# Patient Record
Sex: FEMALE | Race: WHITE | HISPANIC OR LATINO | URBAN - METROPOLITAN AREA
[De-identification: names, ages, dates, MRNs, and addresses within clinical notes are randomized per-mention and may not be internally consistent; named-entity substitution may affect disease eponyms.]

---

## 2018-01-19 ENCOUNTER — APPOINTMENT (RX ONLY)
Dept: URBAN - METROPOLITAN AREA CLINIC 128 | Facility: CLINIC | Age: 28
Setting detail: DERMATOLOGY
End: 2018-01-19

## 2018-01-19 DIAGNOSIS — L40.0 PSORIASIS VULGARIS: ICD-10-CM

## 2018-01-19 PROCEDURE — 99202 OFFICE O/P NEW SF 15 MIN: CPT

## 2018-01-19 PROCEDURE — ? COUNSELING

## 2018-01-19 PROCEDURE — ? TREATMENT REGIMEN

## 2018-01-19 PROCEDURE — ? PRESCRIPTION

## 2018-01-19 RX ORDER — FLURANDRENOLIDE 0.5 MG/ML
LOTION TOPICAL
Qty: 1 | Refills: 4 | Status: ERX | COMMUNITY
Start: 2018-01-19

## 2018-01-19 RX ORDER — TRIAMCINOLONE ACETONIDE 1 MG/G
CREAM TOPICAL
Qty: 1 | Refills: 4 | Status: ERX | COMMUNITY
Start: 2018-01-19

## 2018-01-19 RX ADMIN — FLURANDRENOLIDE: 0.5 LOTION TOPICAL at 00:00

## 2018-01-19 RX ADMIN — TRIAMCINOLONE ACETONIDE: 1 CREAM TOPICAL at 00:00

## 2018-01-19 ASSESSMENT — LOCATION SIMPLE DESCRIPTION DERM
LOCATION SIMPLE: LEFT FOREARM
LOCATION SIMPLE: RIGHT BREAST
LOCATION SIMPLE: SCALP
LOCATION SIMPLE: LEFT CALF
LOCATION SIMPLE: LEFT POSTERIOR THIGH
LOCATION SIMPLE: RIGHT CALF
LOCATION SIMPLE: RIGHT BUTTOCK
LOCATION SIMPLE: LEFT UPPER ARM
LOCATION SIMPLE: CHEST
LOCATION SIMPLE: RIGHT THIGH
LOCATION SIMPLE: LEFT THIGH
LOCATION SIMPLE: RIGHT PRETIBIAL REGION
LOCATION SIMPLE: LEFT PRETIBIAL REGION
LOCATION SIMPLE: RIGHT FOREARM
LOCATION SIMPLE: RIGHT POSTERIOR THIGH
LOCATION SIMPLE: RIGHT UPPER BACK
LOCATION SIMPLE: RIGHT UPPER ARM

## 2018-01-19 ASSESSMENT — LOCATION ZONE DERM
LOCATION ZONE: TRUNK
LOCATION ZONE: SCALP
LOCATION ZONE: ARM
LOCATION ZONE: LEG

## 2018-01-19 ASSESSMENT — LOCATION DETAILED DESCRIPTION DERM
LOCATION DETAILED: RIGHT PROXIMAL CALF
LOCATION DETAILED: RIGHT INFERIOR MEDIAL UPPER BACK
LOCATION DETAILED: LEFT VENTRAL LATERAL DISTAL FOREARM
LOCATION DETAILED: LEFT DISTAL POSTERIOR THIGH
LOCATION DETAILED: LEFT PROXIMAL DORSAL FOREARM
LOCATION DETAILED: LEFT LATERAL SUPERIOR CHEST
LOCATION DETAILED: RIGHT MEDIAL BREAST 1-2:00 REGION
LOCATION DETAILED: LEFT PROXIMAL PRETIBIAL REGION
LOCATION DETAILED: RIGHT DISTAL POSTERIOR THIGH
LOCATION DETAILED: RIGHT VENTRAL DISTAL FOREARM
LOCATION DETAILED: RIGHT DISTAL PRETIBIAL REGION
LOCATION DETAILED: RIGHT ANTERIOR DISTAL THIGH
LOCATION DETAILED: RIGHT ANTERIOR DISTAL UPPER ARM
LOCATION DETAILED: LEFT PROXIMAL CALF
LOCATION DETAILED: RIGHT LATERAL BUTTOCK
LOCATION DETAILED: LEFT SUPERIOR PARIETAL SCALP
LOCATION DETAILED: LEFT ANTERIOR DISTAL UPPER ARM
LOCATION DETAILED: LEFT ANTERIOR PROXIMAL THIGH

## 2018-01-19 ASSESSMENT — PGA PSORIASIS: PGA PSORIASIS: MODERATE (MODERATE PLAQUE ELEVATION, MODERATE ERYTHEMA, COARSE SCALE PREDOMINATES)

## 2018-01-19 NOTE — PROCEDURE: TREATMENT REGIMEN
Action 4: Continue
Detail Level: Zone
Otc Regimen: Recommended T-Gel or T-Sal shampoo every other day

## 2018-01-19 NOTE — HPI: RASH
How Severe Is Your Rash?: moderate
Is This A New Presentation, Or A Follow-Up?: Rash
Additional History: New patient today

## 2018-02-09 ENCOUNTER — APPOINTMENT (RX ONLY)
Dept: URBAN - METROPOLITAN AREA CLINIC 128 | Facility: CLINIC | Age: 28
Setting detail: DERMATOLOGY
End: 2018-02-09

## 2018-02-09 DIAGNOSIS — L40.0 PSORIASIS VULGARIS: ICD-10-CM | Status: IMPROVED

## 2018-02-09 PROCEDURE — 99213 OFFICE O/P EST LOW 20 MIN: CPT

## 2018-02-09 PROCEDURE — ? COUNSELING

## 2018-02-09 PROCEDURE — ? TREATMENT REGIMEN

## 2018-02-09 PROCEDURE — ? PRESCRIPTION

## 2018-02-09 RX ORDER — SELENIUM SULFIDE 23 MG/ML
SHAMPOO TOPICAL
Qty: 1 | Refills: 5 | Status: ERX | COMMUNITY
Start: 2018-02-09

## 2018-02-09 RX ORDER — CLOBETASOL PROPIONATE 0.5 MG/G
OINTMENT TOPICAL
Qty: 1 | Refills: 3 | Status: ERX | COMMUNITY
Start: 2018-02-09

## 2018-02-09 RX ORDER — CALCIPOTRIENE 50 UG/G
CREAM TOPICAL
Qty: 1 | Refills: 3 | Status: ERX | COMMUNITY
Start: 2018-02-09

## 2018-02-09 RX ADMIN — SELENIUM SULFIDE: 23 SHAMPOO TOPICAL at 00:00

## 2018-02-09 RX ADMIN — CALCIPOTRIENE: 50 CREAM TOPICAL at 00:00

## 2018-02-09 RX ADMIN — CLOBETASOL PROPIONATE: 0.5 OINTMENT TOPICAL at 00:00

## 2018-02-09 ASSESSMENT — LOCATION DETAILED DESCRIPTION DERM
LOCATION DETAILED: LEFT VENTRAL LATERAL DISTAL FOREARM
LOCATION DETAILED: LEFT DISTAL POSTERIOR THIGH
LOCATION DETAILED: RIGHT ANTERIOR DISTAL THIGH
LOCATION DETAILED: RIGHT DISTAL POSTERIOR THIGH
LOCATION DETAILED: LEFT ANTERIOR DISTAL UPPER ARM
LOCATION DETAILED: LEFT SUPERIOR PARIETAL SCALP
LOCATION DETAILED: LEFT LATERAL SUPERIOR CHEST
LOCATION DETAILED: LEFT PROXIMAL PRETIBIAL REGION
LOCATION DETAILED: RIGHT MEDIAL BREAST 1-2:00 REGION
LOCATION DETAILED: RIGHT VENTRAL DISTAL FOREARM
LOCATION DETAILED: RIGHT DISTAL PRETIBIAL REGION
LOCATION DETAILED: RIGHT ANTERIOR DISTAL UPPER ARM
LOCATION DETAILED: RIGHT INFERIOR MEDIAL UPPER BACK
LOCATION DETAILED: RIGHT LATERAL BUTTOCK
LOCATION DETAILED: LEFT ANTERIOR PROXIMAL THIGH
LOCATION DETAILED: LEFT PROXIMAL CALF
LOCATION DETAILED: RIGHT PROXIMAL CALF
LOCATION DETAILED: LEFT PROXIMAL DORSAL FOREARM

## 2018-02-09 ASSESSMENT — LOCATION SIMPLE DESCRIPTION DERM
LOCATION SIMPLE: RIGHT BUTTOCK
LOCATION SIMPLE: RIGHT UPPER BACK
LOCATION SIMPLE: RIGHT BREAST
LOCATION SIMPLE: RIGHT FOREARM
LOCATION SIMPLE: RIGHT UPPER ARM
LOCATION SIMPLE: LEFT THIGH
LOCATION SIMPLE: RIGHT PRETIBIAL REGION
LOCATION SIMPLE: LEFT POSTERIOR THIGH
LOCATION SIMPLE: LEFT PRETIBIAL REGION
LOCATION SIMPLE: LEFT UPPER ARM
LOCATION SIMPLE: SCALP
LOCATION SIMPLE: CHEST
LOCATION SIMPLE: RIGHT POSTERIOR THIGH
LOCATION SIMPLE: RIGHT CALF
LOCATION SIMPLE: LEFT CALF
LOCATION SIMPLE: LEFT FOREARM
LOCATION SIMPLE: RIGHT THIGH

## 2018-02-09 ASSESSMENT — LOCATION ZONE DERM
LOCATION ZONE: TRUNK
LOCATION ZONE: SCALP
LOCATION ZONE: ARM
LOCATION ZONE: LEG

## 2018-02-09 NOTE — PROCEDURE: TREATMENT REGIMEN
Action 4: Continue
Continue Regimen: T-gel shampoo, Nolix once a day, Triamcinolone cream on the thin plaques
Other Instructions: Discussed UVB, MTX
Sig For Treatment 1 (If Needed): applied twice daily
Detail Level: Zone
Start Regimen: Calcipotriene cream Twice a day, Clobetasol ointment on the thick plaques, SelRx shampoo
Treatment 1: Triamcinolone 0.1% cream

## 2019-02-21 ENCOUNTER — APPOINTMENT (RX ONLY)
Dept: URBAN - METROPOLITAN AREA CLINIC 128 | Facility: CLINIC | Age: 29
Setting detail: DERMATOLOGY
End: 2019-02-21

## 2019-02-21 DIAGNOSIS — L40.4 GUTTATE PSORIASIS: ICD-10-CM

## 2019-02-21 PROBLEM — L40.0 PSORIASIS VULGARIS: Status: ACTIVE | Noted: 2019-02-21

## 2019-02-21 PROCEDURE — ? COUNSELING

## 2019-02-21 PROCEDURE — ? DIAGNOSIS COMMENT

## 2019-02-21 PROCEDURE — ? TREATMENT REGIMEN

## 2019-02-21 PROCEDURE — 99214 OFFICE O/P EST MOD 30 MIN: CPT

## 2019-02-21 PROCEDURE — ? PRESCRIPTION

## 2019-02-21 RX ORDER — TRIAMCINOLONE ACETONIDE 1 MG/G
CREAM TOPICAL
Qty: 1 | Refills: 1 | Status: ERX

## 2019-02-21 RX ORDER — TRIAMCINOLONE ACETONIDE 0.25 MG/G
CREAM TOPICAL
Qty: 1 | Refills: 1 | Status: ERX | COMMUNITY
Start: 2019-02-21

## 2019-02-21 RX ADMIN — TRIAMCINOLONE ACETONIDE: 0.25 CREAM TOPICAL at 15:46

## 2019-02-21 ASSESSMENT — LOCATION DETAILED DESCRIPTION DERM
LOCATION DETAILED: LEFT PROXIMAL PRETIBIAL REGION
LOCATION DETAILED: RIGHT PROXIMAL PRETIBIAL REGION
LOCATION DETAILED: LEFT LATERAL ABDOMEN
LOCATION DETAILED: RIGHT PROXIMAL DORSAL FOREARM
LOCATION DETAILED: LEFT INFERIOR CENTRAL MALAR CHEEK
LOCATION DETAILED: LEFT PROXIMAL DORSAL FOREARM

## 2019-02-21 ASSESSMENT — LOCATION ZONE DERM
LOCATION ZONE: LEG
LOCATION ZONE: FACE
LOCATION ZONE: TRUNK
LOCATION ZONE: ARM

## 2019-02-21 ASSESSMENT — LOCATION SIMPLE DESCRIPTION DERM
LOCATION SIMPLE: ABDOMEN
LOCATION SIMPLE: LEFT FOREARM
LOCATION SIMPLE: LEFT PRETIBIAL REGION
LOCATION SIMPLE: RIGHT FOREARM
LOCATION SIMPLE: LEFT CHEEK
LOCATION SIMPLE: RIGHT PRETIBIAL REGION

## 2019-02-21 NOTE — PROCEDURE: TREATMENT REGIMEN
Start Regimen: - triamcinolone acetonide 0.1 % topical cream on body BID x 2 wks then QD on 3rd wk, then d/c on 4th wk\\n- triamcinolone acetonide 0.025 % topical cream BID x 2 wks to face then d/c
Action 4: Continue
Other Instructions: F/u in 3 wks w/ Kenith Goldmann for re-evaluation and discuss other treatments if no improvement (oral vs. injection therapy)
Continue Regimen: Good skin care (cool showers, dove soap, moisturize daily with CeraVe)
Detail Level: Zone

## 2019-02-21 NOTE — PROCEDURE: DIAGNOSIS COMMENT
Detail Level: Detailed
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 Patient states she does not have the time nor the financial capabilities to do NBUVB. At this time, she would like to treat topically only.  F/U in 3 weeks to re-eval with LT for alt tx options if no improvement

## 2019-02-25 ENCOUNTER — RX ONLY (OUTPATIENT)
Age: 29
Setting detail: RX ONLY
End: 2019-02-25

## 2019-02-25 RX ORDER — TRIAMCINOLONE ACETONIDE 0.25 MG/G
CREAM TOPICAL
Qty: 1 | Refills: 1 | Status: ERX

## 2019-02-25 RX ORDER — TRIAMCINOLONE ACETONIDE 1 MG/G
CREAM TOPICAL
Qty: 1 | Refills: 1 | Status: ERX

## 2020-04-24 ENCOUNTER — APPOINTMENT (RX ONLY)
Dept: URBAN - METROPOLITAN AREA CLINIC 116 | Facility: CLINIC | Age: 30
Setting detail: DERMATOLOGY
End: 2020-04-24

## 2020-04-24 ENCOUNTER — RX ONLY (OUTPATIENT)
Age: 30
Setting detail: RX ONLY
End: 2020-04-24

## 2020-04-24 DIAGNOSIS — L71.0 PERIORAL DERMATITIS: ICD-10-CM

## 2020-04-24 DIAGNOSIS — L40.4 GUTTATE PSORIASIS: ICD-10-CM

## 2020-04-24 PROBLEM — Z92.89 PERSONAL HISTORY OF OTHER MEDICAL TREATMENT: Status: ACTIVE | Noted: 2020-04-24

## 2020-04-24 PROCEDURE — ? COUNSELING

## 2020-04-24 PROCEDURE — ? PRESCRIPTION

## 2020-04-24 PROCEDURE — ? TREATMENT REGIMEN

## 2020-04-24 PROCEDURE — ? TELEHEALTH ASSESSMENT

## 2020-04-24 PROCEDURE — 99213 OFFICE O/P EST LOW 20 MIN: CPT | Mod: GT

## 2020-04-24 RX ORDER — TRIAMCINOLONE ACETONIDE 1 MG/G
CREAM TOPICAL
Qty: 1 | Refills: 0 | Status: ERX

## 2020-04-24 RX ORDER — METRONIDAZOLE 7.5 MG/G
CREAM TOPICAL
Qty: 1 | Refills: 3 | Status: ERX | COMMUNITY
Start: 2020-04-24

## 2020-04-24 RX ORDER — CALCIPOTRIENE 0.05 MG/G
CREAM TOPICAL
Qty: 1 | Refills: 1 | Status: ERX | COMMUNITY
Start: 2020-04-24

## 2020-04-24 RX ORDER — HYDROCORTISONE 25 MG/G
CREAM TOPICAL
Qty: 1 | Refills: 0 | Status: ERX | COMMUNITY
Start: 2020-04-24

## 2020-04-24 RX ADMIN — CALCIPOTRIENE: 0.05 CREAM TOPICAL at 00:00

## 2020-04-24 RX ADMIN — METRONIDAZOLE: 7.5 CREAM TOPICAL at 00:00

## 2020-04-24 RX ADMIN — HYDROCORTISONE: 25 CREAM TOPICAL at 00:00

## 2020-04-24 ASSESSMENT — LOCATION ZONE DERM
LOCATION ZONE: ARM
LOCATION ZONE: FACE
LOCATION ZONE: LIP

## 2020-04-24 ASSESSMENT — LOCATION DETAILED DESCRIPTION DERM
LOCATION DETAILED: RIGHT PROXIMAL DORSAL FOREARM
LOCATION DETAILED: LEFT PROXIMAL DORSAL FOREARM
LOCATION DETAILED: RIGHT LOWER CUTANEOUS LIP
LOCATION DETAILED: RIGHT SUPERIOR MEDIAL BUCCAL CHEEK
LOCATION DETAILED: PHILTRUM
LOCATION DETAILED: LEFT CENTRAL BUCCAL CHEEK

## 2020-04-24 ASSESSMENT — LOCATION SIMPLE DESCRIPTION DERM
LOCATION SIMPLE: UPPER LIP
LOCATION SIMPLE: LEFT FOREARM
LOCATION SIMPLE: RIGHT CHEEK
LOCATION SIMPLE: RIGHT LIP
LOCATION SIMPLE: RIGHT FOREARM
LOCATION SIMPLE: LEFT CHEEK

## 2020-04-24 NOTE — PROCEDURE: TELEHEALTH ASSESSMENT
Assessment (Free Text): Due to possible spread of COVID-19, this visit was conducted in real-time with an audio and video platform, [email or web address] me The face-to-face time with the patient was 15 minutes.
Recommendation Preamble: Assessment:
Detail Level: Simple
Assessment (Free Text): The patient verified their identity with their photo ID and consented to evaluation and management of their medical condition through telehealth. This visit was conducted in real-time with an audio and video platform, [email or web address] me during the [address] during a state of national emergency. This telehealth visit was medically necessary to prevent the community spread of COVID-19. \\n\\nThe patient is aware that we will bill their insurance for this telehealth visit following insurance guidelines. \\n\\nFace to face time with the patient was 15 minutes. \\n\\nConsent:\\nPatient consented to the following prior to the visit: \"I consent and understand that I am initiating a synchronous video health session with my healthcare provider and will be asked to confirm my identity with photo identification. I understand that there are potential risks to this technology, including interruptions, potential data breaches, and technical difficulties. Poor video quality may interfere with my healthcare providerâs ability to accurately diagnose my condition. I understand that my health care provider or I can discontinue the telemedicine consult/visit if it is felt that the videoconferencing connections are not adequate for the situation. I also understand that my insurance carrier will be billed for healthcare services rendered. \"

## 2020-04-24 NOTE — PROCEDURE: TREATMENT REGIMEN
Action 1: Continue
Continue Regimen: Triamcinolone ID X 2 weeks, then switch to calcipotreine BID
Detail Level: Detailed